# Patient Record
Sex: FEMALE | Race: WHITE
[De-identification: names, ages, dates, MRNs, and addresses within clinical notes are randomized per-mention and may not be internally consistent; named-entity substitution may affect disease eponyms.]

---

## 2022-08-22 ENCOUNTER — HOSPITAL ENCOUNTER (EMERGENCY)
Dept: HOSPITAL 26 - MED | Age: 27
Discharge: HOME | End: 2022-08-22
Payer: COMMERCIAL

## 2022-08-22 VITALS — SYSTOLIC BLOOD PRESSURE: 111 MMHG | DIASTOLIC BLOOD PRESSURE: 75 MMHG

## 2022-08-22 VITALS — WEIGHT: 160 LBS | BODY MASS INDEX: 27.31 KG/M2 | HEIGHT: 64 IN

## 2022-08-22 VITALS — SYSTOLIC BLOOD PRESSURE: 117 MMHG | DIASTOLIC BLOOD PRESSURE: 81 MMHG
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
## 2022-08-22 DIAGNOSIS — S39.012A: Primary | ICD-10-CM
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
## 2022-08-22 PROCEDURE — 81002 URINALYSIS NONAUTO W/O SCOPE: CPT

## 2022-08-22 PROCEDURE — 81025 URINE PREGNANCY TEST: CPT

## 2022-08-22 PROCEDURE — 99283 EMERGENCY DEPT VISIT LOW MDM: CPT

## 2022-08-22 PROCEDURE — 96372 THER/PROPH/DIAG INJ SC/IM: CPT

## 2022-08-22 NOTE — NUR
Patient discharged with v/s stable. Written and verbal after care instructions 
given and explained. 

Patient alert, oriented and verbalized understanding of instructions. 
Ambulatory with steady gait. All questions addressed prior to discharge. ID 
band removed. Patient advised to follow up with PMD. Rx of NAPROSYN, SALONPAS 
given. Patient educated on indication of medication including possible reaction 
and side effects. Opportunity to ask questions provided and answered.

## 2022-08-22 NOTE — NUR
27YR OLD FEMALE BIB SELF C/O BACK PAIN.   DENIES INJURY OR TRAUMA.  PAIN LEVEL  
8/10.  PAIN DOES NOT RADIATE TO LEGS . PT IS A&OX4.  RESP EVEN AND UNLABORED. 



NKDA

NO MED HX

## 2023-06-16 ENCOUNTER — HOSPITAL ENCOUNTER (EMERGENCY)
Dept: HOSPITAL 26 - MED | Age: 28
Discharge: HOME | End: 2023-06-16
Payer: COMMERCIAL

## 2023-06-16 VITALS — WEIGHT: 163 LBS | HEIGHT: 66 IN | BODY MASS INDEX: 26.2 KG/M2

## 2023-06-16 VITALS — SYSTOLIC BLOOD PRESSURE: 124 MMHG | DIASTOLIC BLOOD PRESSURE: 82 MMHG
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
## 2023-06-16 DIAGNOSIS — S61.011A: Primary | ICD-10-CM
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
## 2023-06-16 DIAGNOSIS — Z79.899: ICD-10-CM

## 2023-06-16 NOTE — NUR
Pt bibs for lac at R thumb from an accident with a  this morning. Lac 
is less than 1 cm at tip of thumb and nail. Bleeding is controlled. Pain is 
tolerable, 4/10. Vss, no ss of acute distress, breathing equal and unlabored, 
speech clear, MD has seen pt. Pt has no other complaints at this time.